# Patient Record
Sex: MALE | NOT HISPANIC OR LATINO | Employment: PART TIME | URBAN - METROPOLITAN AREA
[De-identification: names, ages, dates, MRNs, and addresses within clinical notes are randomized per-mention and may not be internally consistent; named-entity substitution may affect disease eponyms.]

---

## 2022-06-26 ENCOUNTER — HOSPITAL ENCOUNTER (EMERGENCY)
Facility: HOSPITAL | Age: 22
Discharge: HOME | End: 2022-06-26
Attending: EMERGENCY MEDICINE
Payer: COMMERCIAL

## 2022-06-26 ENCOUNTER — APPOINTMENT (EMERGENCY)
Dept: RADIOLOGY | Facility: HOSPITAL | Age: 22
End: 2022-06-26
Payer: COMMERCIAL

## 2022-06-26 VITALS
WEIGHT: 160.94 LBS | RESPIRATION RATE: 16 BRPM | OXYGEN SATURATION: 100 % | SYSTOLIC BLOOD PRESSURE: 120 MMHG | DIASTOLIC BLOOD PRESSURE: 61 MMHG | HEART RATE: 65 BPM | TEMPERATURE: 97.9 F

## 2022-06-26 DIAGNOSIS — S46.912A STRAIN OF LEFT SHOULDER, INITIAL ENCOUNTER: Primary | ICD-10-CM

## 2022-06-26 PROCEDURE — 99284 EMERGENCY DEPT VISIT MOD MDM: CPT | Performed by: EMERGENCY MEDICINE

## 2022-06-26 PROCEDURE — 99283 EMERGENCY DEPT VISIT LOW MDM: CPT

## 2022-06-26 PROCEDURE — 73030 X-RAY EXAM OF SHOULDER: CPT

## 2022-06-26 RX ORDER — IBUPROFEN 600 MG/1
600 TABLET ORAL ONCE
Status: COMPLETED | OUTPATIENT
Start: 2022-06-26 | End: 2022-06-26

## 2022-06-26 RX ORDER — IBUPROFEN 600 MG/1
600 TABLET ORAL EVERY 6 HOURS PRN
Qty: 30 TABLET | Refills: 0 | Status: SHIPPED | OUTPATIENT
Start: 2022-06-26

## 2022-06-26 RX ADMIN — IBUPROFEN 600 MG: 600 TABLET ORAL at 22:16

## 2022-06-27 NOTE — DISCHARGE INSTRUCTIONS
Rotator Cuff Injury   WHAT YOU NEED TO KNOW:   A rotator cuff injury is damage to the muscles or tendons of your rotator cuff  The rotator cuff is made up of a group of muscles and tendons that hold the shoulder joint in place  The damage may include stretching of your muscle or tears in the tendons  It may also include inflammation of the bursa (small sack of fluid around the joint)  Acetaminophen: This medicine decreases pain  Acetaminophen can cause liver damage if not taken correctly  NSAIDs:  These medicines decrease swelling, pain, and fever  Ask your healthcare provider which medicine is right for you  Ask how much to take and when to take it  Take as directed  NSAIDs can cause stomach bleeding or kidney problems if not taken correctly  Follow up with your healthcare provider or orthopedist as directed:   Physical therapy:  A physical therapist can teach you exercises to help improve movement and strength, and to decrease pain  These exercises may help you go back to your usual activities or return to playing sports  Self-care:   Rest:  Rest may help your shoulder heal  Overuse of your shoulder can make your injury worse  Avoid heavy lifting, using your arms over your head, or any other activity that makes the pain worse  Use ice on your shoulder every few hours for the first several days  This may help decrease pain and swelling  After the first several days, a heating pad may help relax the muscles in your shoulder  Contact your healthcare provider or orthopedist if:   The pain in your shoulder or arm is not improving, or is worse than before you started treatment  You have new pain in your neck  You have a fever  Return to the emergency department if:  You suddenly cannot move your arm  You have severe stomach or back pain, are vomiting blood, or have black bowel movements

## 2022-06-27 NOTE — ED PROVIDER NOTES
History  Chief Complaint   Patient presents with    Shoulder Pain     Left shoulder pain, reports tripped in grass yesterday and reports arm overextended over his head, reports movement increases pain  Patient reports he does have full range of motion  23 yo M c/o left shoulder pain after accidental fall yesterday, tripped in the grass and hyperextended his arm above his head  He has been able to move it since then, will just have sudden twinges of pain with movement or lifting  He has been wearing it in a sling, which feels better  History provided by:  Patient      None       History reviewed  No pertinent past medical history  History reviewed  No pertinent surgical history  History reviewed  No pertinent family history  I have reviewed and agree with the history as documented  E-Cigarette/Vaping     E-Cigarette/Vaping Substances     Social History     Tobacco Use    Smoking status: Never Smoker   Substance Use Topics    Alcohol use: Never    Drug use: Never       Review of Systems   Constitutional: Negative for appetite change, chills and fever  HENT: Negative for sore throat  Respiratory: Negative for cough, shortness of breath and wheezing  Cardiovascular: Negative for chest pain and palpitations  Gastrointestinal: Negative for abdominal pain, diarrhea, nausea and vomiting  Genitourinary: Negative for dysuria and hematuria  Musculoskeletal: Negative for neck pain  Skin: Negative for rash  Neurological: Negative for dizziness, weakness and headaches  Psychiatric/Behavioral: Negative for suicidal ideas  All other systems reviewed and are negative  Physical Exam  Physical Exam  Vitals and nursing note reviewed  Constitutional:       Appearance: He is well-developed  HENT:      Head: Normocephalic and atraumatic        Right Ear: External ear normal       Left Ear: External ear normal       Nose: Nose normal    Eyes:      Conjunctiva/sclera: Conjunctivae normal       Pupils: Pupils are equal, round, and reactive to light  Cardiovascular:      Rate and Rhythm: Normal rate  Pulmonary:      Effort: Pulmonary effort is normal    Abdominal:      Tenderness: There is no abdominal tenderness  Musculoskeletal:         General: Normal range of motion  Left shoulder: No swelling, deformity, tenderness, bony tenderness or crepitus  Normal range of motion  Normal strength  Normal pulse  Cervical back: Normal range of motion and neck supple  Skin:     General: Skin is warm and dry  Neurological:      Mental Status: He is alert and oriented to person, place, and time  Cranial Nerves: No cranial nerve deficit  Coordination: Coordination normal    Psychiatric:         Behavior: Behavior normal          Thought Content:  Thought content normal          Judgment: Judgment normal          Vital Signs  ED Triage Vitals [06/26/22 2130]   Temperature Pulse Respirations Blood Pressure SpO2   97 9 °F (36 6 °C) 65 16 120/61 100 %      Temp Source Heart Rate Source Patient Position - Orthostatic VS BP Location FiO2 (%)   Temporal Monitor Sitting Right arm --      Pain Score       8           Vitals:    06/26/22 2130   BP: 120/61   Pulse: 65   Patient Position - Orthostatic VS: Sitting         Visual Acuity      ED Medications  Medications   ibuprofen (MOTRIN) tablet 600 mg (600 mg Oral Given 6/26/22 2216)       Diagnostic Studies  Results Reviewed     None                 XR shoulder 2+ views LEFT   ED Interpretation by Rubén Brandt MD (06/26 2229)   No acute findings                    Procedures  Procedures         ED Course                                             MDM    Disposition  Final diagnoses:   Strain of left shoulder, initial encounter     Time reflects when diagnosis was documented in both MDM as applicable and the Disposition within this note     Time User Action Codes Description Comment    6/26/2022 10:30 PM Vladimir Thornton [[id number]] Strain of left shoulder, initial encounter       ED Disposition     ED Disposition   Discharge    Condition   Good    Date/Time   Sun Jun 26, 2022 10:29 PM    Deborah Youngblood discharge to home/self care  Follow-up Information     Follow up With Specialties Details Why Contact Info Additional [address] [facility] Go to   [address] [facility] [address] [id number]  [address] [place] [address], [address] [facility], [address] [place] [address], [id number] [phone]          Patient's Medications   Discharge Prescriptions    IBUPROFEN (MOTRIN) 600 MG TABLET    Take 1 tablet (600 mg total) by mouth every 6 (six) hours as needed for mild pain or moderate pain       Start Date: 6/26/2022 End Date: --       Order Dose: 600 mg       Quantity: 30 tablet    Refills: 0       No discharge procedures on file      PDMP Review     None          ED Provider  Electronically Signed by           Rodrigo Bettencourt MD  06/26/22 1268